# Patient Record
Sex: FEMALE | Race: WHITE | Employment: OTHER | URBAN - NONMETROPOLITAN AREA
[De-identification: names, ages, dates, MRNs, and addresses within clinical notes are randomized per-mention and may not be internally consistent; named-entity substitution may affect disease eponyms.]

---

## 2017-02-13 RX ORDER — HYDROCHLOROTHIAZIDE 25 MG/1
25 TABLET ORAL DAILY
Qty: 90 TABLET | Refills: 0 | Status: SHIPPED | OUTPATIENT
Start: 2017-02-13 | End: 2017-05-12

## 2017-02-13 RX ORDER — ATENOLOL 50 MG/1
TABLET ORAL
Qty: 180 TABLET | Refills: 0 | Status: SHIPPED | OUTPATIENT
Start: 2017-02-13 | End: 2017-05-12

## 2017-03-17 ENCOUNTER — PATIENT OUTREACH (OUTPATIENT)
Dept: FAMILY MEDICINE CLINIC | Facility: CLINIC | Age: 82
End: 2017-03-17

## 2017-05-12 RX ORDER — ATENOLOL 50 MG/1
TABLET ORAL
Qty: 180 TABLET | Refills: 0 | Status: SHIPPED | OUTPATIENT
Start: 2017-05-12 | End: 2017-12-13

## 2017-05-12 RX ORDER — HYDROCHLOROTHIAZIDE 25 MG/1
25 TABLET ORAL DAILY
Qty: 90 TABLET | Refills: 0 | Status: SHIPPED | OUTPATIENT
Start: 2017-05-12 | End: 2017-08-14

## 2017-05-12 NOTE — TELEPHONE ENCOUNTER
Last OV: 11/30/2016  120/80  Last labs: 6/8/2016  Atenolol: 2/13/2017 #180 no RF  HCTZ: 2/13/2017 #90 no RF    Future Appointments  Date Time Provider Teofilo Brian   12/13/2017 2:20 PM Angelica Bach MD [facility]

## 2017-06-28 ENCOUNTER — OFFICE VISIT (OUTPATIENT)
Dept: FAMILY MEDICINE CLINIC | Facility: CLINIC | Age: 82
End: 2017-06-28

## 2017-06-28 VITALS
TEMPERATURE: 99 F | SYSTOLIC BLOOD PRESSURE: 136 MMHG | DIASTOLIC BLOOD PRESSURE: 74 MMHG | WEIGHT: 156.25 LBS | BODY MASS INDEX: 32 KG/M2

## 2017-06-28 DIAGNOSIS — E55.9 VITAMIN D DEFICIENCY: ICD-10-CM

## 2017-06-28 DIAGNOSIS — Z79.899 ENCOUNTER FOR LONG-TERM (CURRENT) USE OF MEDICATIONS: Primary | ICD-10-CM

## 2017-06-28 DIAGNOSIS — I10 ESSENTIAL HYPERTENSION, BENIGN: ICD-10-CM

## 2017-06-28 DIAGNOSIS — E78.2 MIXED HYPERLIPIDEMIA: ICD-10-CM

## 2017-06-28 PROCEDURE — 80053 COMPREHEN METABOLIC PANEL: CPT | Performed by: FAMILY MEDICINE

## 2017-06-28 PROCEDURE — 99213 OFFICE O/P EST LOW 20 MIN: CPT | Performed by: FAMILY MEDICINE

## 2017-06-28 PROCEDURE — 80061 LIPID PANEL: CPT | Performed by: FAMILY MEDICINE

## 2017-06-28 PROCEDURE — 82306 VITAMIN D 25 HYDROXY: CPT | Performed by: FAMILY MEDICINE

## 2017-06-28 NOTE — ASSESSMENT & PLAN NOTE
As for her cholesterol, Lipids are well controlled,   PLAN: will continue present medications, reviewed diet, exercise and weight control, and labs as ordered

## 2017-06-28 NOTE — PROGRESS NOTES
Trent Shepherd is a 80year old female. Patient presents with:  Hypertension: f/u on B/P.... Jen Navarro RM 6      HPI:   Patient presents for recheck of her  hypertension.  Pt has been taking medications as instructed, no medication side effects, home BP monitori 136/74  11/30/16 : 120/80  05/18/16 : 138/82  02/29/16 : 140/80  10/14/15 : 148/78  08/12/15 : 124/82      Wt Readings from Last 6 Encounters:  06/28/17 : 156 lb 4 oz  11/30/16 : 152 lb 2 oz  05/18/16 : 153 lb  02/29/16 : 143 lb 4 oz  10/14/15 : 156 lb  08 hypertension, benign     As for her hypertension, Blood Pressure is well controlled, no significant medication side effects noted.    PLAN: will continue present medications, reviewed diet, exercise and weight control, and labs as ordered             Other

## 2017-06-30 DIAGNOSIS — E55.9 VITAMIN D DEFICIENCY: ICD-10-CM

## 2017-06-30 DIAGNOSIS — Z00.00 ROUTINE HEALTH MAINTENANCE: ICD-10-CM

## 2017-06-30 DIAGNOSIS — E78.2 MIXED HYPERLIPIDEMIA: ICD-10-CM

## 2017-06-30 DIAGNOSIS — I10 ESSENTIAL HYPERTENSION, BENIGN: Primary | ICD-10-CM

## 2017-07-01 ENCOUNTER — TELEPHONE (OUTPATIENT)
Dept: FAMILY MEDICINE CLINIC | Facility: CLINIC | Age: 82
End: 2017-07-01

## 2017-08-14 ENCOUNTER — TELEPHONE (OUTPATIENT)
Dept: FAMILY MEDICINE CLINIC | Facility: CLINIC | Age: 82
End: 2017-08-14

## 2017-08-14 RX ORDER — METOPROLOL TARTRATE 100 MG/1
100 TABLET ORAL 2 TIMES DAILY
Qty: 180 TABLET | Refills: 0 | Status: SHIPPED | OUTPATIENT
Start: 2017-08-14 | End: 2017-11-13

## 2017-08-14 RX ORDER — HYDROCHLOROTHIAZIDE 25 MG/1
25 TABLET ORAL DAILY
Qty: 90 TABLET | Refills: 0 | Status: SHIPPED | OUTPATIENT
Start: 2017-08-14 | End: 2017-11-13

## 2017-08-14 NOTE — TELEPHONE ENCOUNTER
refill as metoprolol tartrate 100 mg po bid  #180  0 refills    I sent it already  You can close unless you need to call

## 2017-08-14 NOTE — TELEPHONE ENCOUNTER
Attempted to reach to notify of change of medication to Metoprolol and that she is not due for lab work. Will try again later.

## 2017-08-14 NOTE — TELEPHONE ENCOUNTER
HAD LABS DONE IN June, BUT RECEIVED LETTER STATING SHE IS DUE FOR LABS. ALSO W[facility].    PLEASE ADVISE

## 2017-08-14 NOTE — TELEPHONE ENCOUNTER
Last office visit: 6/28/17  Last refill: HCTZ 5/12/17 # 90 with 0 refills  Needs alternative for Atenolol 50 mg BID please.

## 2017-11-13 ENCOUNTER — TELEPHONE (OUTPATIENT)
Dept: FAMILY MEDICINE CLINIC | Facility: CLINIC | Age: 82
End: 2017-11-13

## 2017-11-13 RX ORDER — HYDROCHLOROTHIAZIDE 25 MG/1
25 TABLET ORAL DAILY
Qty: 90 TABLET | Refills: 0 | Status: SHIPPED | OUTPATIENT
Start: 2017-11-13 | End: 2018-12-17

## 2017-11-13 RX ORDER — METOPROLOL TARTRATE 100 MG/1
100 TABLET ORAL 2 TIMES DAILY
Qty: 180 TABLET | Refills: 0 | Status: SHIPPED | OUTPATIENT
Start: 2017-11-13 | End: 2017-12-13 | Stop reason: ALTCHOICE

## 2017-11-13 NOTE — TELEPHONE ENCOUNTER
Last OV: 6/28/2017   136/74  Last labs: 6/28/2017  Metoprolol: 8/14/2017 #180 no RF  HCTZ: 8/14/2017 #90 no RF

## 2017-12-13 ENCOUNTER — OFFICE VISIT (OUTPATIENT)
Dept: FAMILY MEDICINE CLINIC | Facility: CLINIC | Age: 82
End: 2017-12-13

## 2017-12-13 VITALS
HEART RATE: 76 BPM | BODY MASS INDEX: 30.65 KG/M2 | HEIGHT: 58.5 IN | TEMPERATURE: 98 F | WEIGHT: 150 LBS | OXYGEN SATURATION: 99 %

## 2017-12-13 DIAGNOSIS — Z23 NEED FOR DIPHTHERIA-TETANUS-PERTUSSIS (TDAP) VACCINE: ICD-10-CM

## 2017-12-13 DIAGNOSIS — Z00.00 ENCOUNTER FOR MEDICARE ANNUAL WELLNESS EXAM: ICD-10-CM

## 2017-12-13 DIAGNOSIS — E78.2 MIXED HYPERLIPIDEMIA: ICD-10-CM

## 2017-12-13 DIAGNOSIS — Z79.899 ENCOUNTER FOR LONG-TERM (CURRENT) USE OF MEDICATIONS: Primary | ICD-10-CM

## 2017-12-13 DIAGNOSIS — I10 ESSENTIAL HYPERTENSION, BENIGN: ICD-10-CM

## 2017-12-13 PROCEDURE — 90471 IMMUNIZATION ADMIN: CPT | Performed by: FAMILY MEDICINE

## 2017-12-13 PROCEDURE — 90715 TDAP VACCINE 7 YRS/> IM: CPT | Performed by: FAMILY MEDICINE

## 2017-12-13 PROCEDURE — G0439 PPPS, SUBSEQ VISIT: HCPCS | Performed by: FAMILY MEDICINE

## 2017-12-13 RX ORDER — ATENOLOL 50 MG/1
TABLET ORAL
Qty: 180 TABLET | Refills: 3 | Status: SHIPPED | OUTPATIENT
Start: 2017-12-13 | End: 2018-12-17

## 2017-12-13 NOTE — PATIENT INSTRUCTIONS
Zak Fisher's SCREENING SCHEDULE   Tests on this list are recommended by your physician but may not be covered, or covered at this frequency, by your insurer. Please check with your insurance carrier before scheduling to verify coverage.    Demi Fang Men who are 73-68 years old and have smoked more than 100 cigarettes in their lifetime   • Anyone with a family history    Colorectal Cancer Screening  Covered up to Age 76     Colonoscopy Screen   Covered every 10 years- more often if abnormal There are n Influenza  Covered Annually   Orders placed or performed in visit on 10/14/15  -INFLUENZA VIRUS VACCINE, PRESERV FREE, >=1YEARS OF AGE    Please get every year    Pneumococcal 13 (Prevnar)  Covered Once after 65   Orders placed or performed in visit on 10 Advance Directives.

## 2017-12-13 NOTE — PROGRESS NOTES
HPI:   María Tucker is a 80year old female who presents for a Medicare Subsequent Annual Wellness visit (Pt already had Initial Annual Wellness).       Her last annual assessment has been over 1 year: Annual Physical due on 11/30/2017         Fall/R [ezetimibe]. CURRENT MEDICATIONS:     Outpatient Prescriptions Marked as Taking for the 12/13/17 encounter (Office Visit) with Pb Licea MD:  hydrochlorothiazide 25 MG Oral Tab Take 1 tablet (25 mg total) by mouth daily.    Plant Sterols and Stanols • TDAP 12/13/2017   • Zoster (Shingles) 12/04/2013   Deferred Date(s) Deferred   • >=3 YRS TRI  MULTIDOSE VIAL (39610) FLU CLINIC 11/30/2016   • Afluria, 9 Years & >, IM 10/14/2015        ASSESSMENT AND OTHER RELEVANT CHRONIC CONDITIONS:   [place] Interaction; Visiting Family      This section provided for quick review of chart, separate sheet to patient  [address] [place] [address],[address] Internal Lab or Procedure External Lab or Procedure   Diabetes Screening      HbgA1C   Annually No Influenza  Covered Annually  Please get every year    Pneumococcal 13 (Prevnar)  Covered Once after 65 10/14/2015 Please get once after your 65th birthday    Pneumococcal 23 (Pneumovax)  Covered Once after 65 03/26/2010 Please get once after your 65th vinay

## 2018-05-10 ENCOUNTER — PATIENT OUTREACH (OUTPATIENT)
Dept: FAMILY MEDICINE CLINIC | Facility: CLINIC | Age: 83
End: 2018-05-10

## 2018-12-17 ENCOUNTER — OFFICE VISIT (OUTPATIENT)
Dept: FAMILY MEDICINE CLINIC | Facility: CLINIC | Age: 83
End: 2018-12-17
Payer: MEDICARE

## 2018-12-17 VITALS
HEART RATE: 68 BPM | TEMPERATURE: 99 F | WEIGHT: 146.38 LBS | BODY MASS INDEX: 29.12 KG/M2 | SYSTOLIC BLOOD PRESSURE: 136 MMHG | OXYGEN SATURATION: 98 % | HEIGHT: 59.5 IN | DIASTOLIC BLOOD PRESSURE: 80 MMHG

## 2018-12-17 DIAGNOSIS — I10 ESSENTIAL HYPERTENSION, BENIGN: ICD-10-CM

## 2018-12-17 DIAGNOSIS — Z28.21 IMMUNIZATION NOT CARRIED OUT BECAUSE OF PATIENT REFUSAL: ICD-10-CM

## 2018-12-17 DIAGNOSIS — Z00.00 ENCOUNTER FOR ANNUAL HEALTH EXAMINATION: ICD-10-CM

## 2018-12-17 PROCEDURE — G0439 PPPS, SUBSEQ VISIT: HCPCS | Performed by: FAMILY MEDICINE

## 2018-12-17 RX ORDER — ATENOLOL 50 MG/1
TABLET ORAL
Qty: 180 TABLET | Refills: 3 | Status: SHIPPED | OUTPATIENT
Start: 2018-12-17 | End: 2019-12-20

## 2018-12-17 RX ORDER — HYDROCHLOROTHIAZIDE 25 MG/1
25 TABLET ORAL DAILY
Qty: 90 TABLET | Refills: 3 | Status: SHIPPED | OUTPATIENT
Start: 2018-12-17 | End: 2019-12-16

## 2018-12-17 NOTE — PROGRESS NOTES
HPI:   Paul Walter is a 80year old female who presents for a Medicare Subsequent Annual Wellness visit (Pt already had Initial Annual Wellness).       Her last annual assessment has been over 1 year: Annual Physical due on 12/13/2018         Fall/R 06/28/2017    CREATSERUM 0.94 06/28/2017    GLU 97 06/28/2017        CBC  (most recent labs)   No results found for: WBC, HGB, PLT     ALLERGIES:   She is allergic to allergy; dust; grass; statins; and zetia [ezetimibe].     CURRENT MEDICATIONS:     [address] Deferred Date(s) Deferred   • >=3 YRS TRI  MULTIDOSE VIAL (63512) FLU CLINIC 11/30/2016, 12/17/2018   • Afluria, 9 Years & >, IM 10/14/2015        ASSESSMENT AND OTHER RELEVANT CHRONIC CONDITIONS:   María Tucker is a 80year old female who presents would you describe your daily physical activity?: Moderate  How would you describe your current health state?: Good  How do you maintain positive mental well-being?: Visiting Friends; Visiting Family; Social Interaction      This section provided for quick r preventive care reminders to display for this patient.  Update Health Maintenance if applicable     Immunizations (Update Immunization Activity if applicable)     Influenza  Covered Annually  Please get every year    Pneumococcal 13 (Prevnar)  Covered Once

## 2018-12-17 NOTE — PATIENT INSTRUCTIONS
Caridad Fisher's SCREENING SCHEDULE   Tests on this list are recommended by your physician but may not be covered, or covered at this frequency, by your insurer. Please check with your insurance carrier before scheduling to verify coverage.    Miladys Mcmullen years old and have smoked more than 100 cigarettes in their lifetime   • Anyone with a family history    Colorectal Cancer Screening  Covered up to Age 76     Colonoscopy Screen   Covered every 10 years- more often if abnormal There are no preventive care Annually Orders placed or performed in visit on 10/14/15   • INFLUENZA VIRUS VACCINE, PRESERV FREE, >=1YEARS OF AGE    Please get every year    Pneumococcal 13 (Prevnar)  Covered Once after 65 Orders placed or performed in visit on 10/14/15   • [facility] also has information from the [address] [place] [address] regarding Advance Directives.

## 2019-09-23 ENCOUNTER — MOBILE ENCOUNTER (OUTPATIENT)
Dept: FAMILY MEDICINE CLINIC | Facility: CLINIC | Age: 84
End: 2019-09-23

## 2019-12-16 DIAGNOSIS — I10 ESSENTIAL HYPERTENSION, BENIGN: ICD-10-CM

## 2019-12-17 RX ORDER — HYDROCHLOROTHIAZIDE 25 MG/1
TABLET ORAL
Qty: 90 TABLET | Refills: 0 | Status: SHIPPED | OUTPATIENT
Start: 2019-12-17 | End: 2019-12-30

## 2019-12-17 NOTE — TELEPHONE ENCOUNTER
Last refill x 1 year on 12/17/18  Last office visit on 12/17/2018  Future Appointments   Date Time Provider Teofilo Weni   12/30/2019  4:00 PM Ada oRbles MD [facility] [place]

## 2019-12-20 DIAGNOSIS — I10 ESSENTIAL HYPERTENSION, BENIGN: ICD-10-CM

## 2019-12-20 RX ORDER — ATENOLOL 50 MG/1
TABLET ORAL
Qty: 180 TABLET | Refills: 3 | Status: SHIPPED | OUTPATIENT
Start: 2019-12-20 | End: 2019-12-30

## 2019-12-20 NOTE — TELEPHONE ENCOUNTER
Last office visit: 12/17/18 /80  Last refill: 12/17/18  Labs Due: 6/30/18  Future Appointments   Date Time Provider Teofilo Brian   12/30/2019  4:00 PM Maryjane Fang MD [facility] [place]

## 2019-12-30 ENCOUNTER — OFFICE VISIT (OUTPATIENT)
Dept: FAMILY MEDICINE CLINIC | Facility: CLINIC | Age: 84
End: 2019-12-30
Payer: MEDICARE

## 2019-12-30 VITALS
RESPIRATION RATE: 18 BRPM | BODY MASS INDEX: 30.16 KG/M2 | WEIGHT: 147.63 LBS | HEART RATE: 110 BPM | SYSTOLIC BLOOD PRESSURE: 138 MMHG | TEMPERATURE: 98 F | DIASTOLIC BLOOD PRESSURE: 98 MMHG | HEIGHT: 58.5 IN

## 2019-12-30 DIAGNOSIS — I10 ESSENTIAL HYPERTENSION, BENIGN: Primary | ICD-10-CM

## 2019-12-30 DIAGNOSIS — Z00.00 ENCOUNTER FOR ANNUAL HEALTH EXAMINATION: ICD-10-CM

## 2019-12-30 DIAGNOSIS — Z28.21 IMMUNIZATION NOT CARRIED OUT BECAUSE OF PATIENT REFUSAL: ICD-10-CM

## 2019-12-30 PROCEDURE — G0439 PPPS, SUBSEQ VISIT: HCPCS | Performed by: FAMILY MEDICINE

## 2019-12-30 RX ORDER — HYDRALAZINE HYDROCHLORIDE 10 MG/1
10 TABLET, FILM COATED ORAL 3 TIMES DAILY PRN
Qty: 90 TABLET | Refills: 1 | Status: SHIPPED | OUTPATIENT
Start: 2019-12-30 | End: 2020-12-31 | Stop reason: ALTCHOICE

## 2019-12-30 RX ORDER — HYDRALAZINE HYDROCHLORIDE 10 MG/1
10 TABLET, FILM COATED ORAL 3 TIMES DAILY PRN
Qty: 90 TABLET | Refills: 1 | Status: SHIPPED | OUTPATIENT
Start: 2019-12-30 | End: 2019-12-30

## 2019-12-30 NOTE — PROGRESS NOTES
HPI:   Jina Henry is a 80year old female who presents for a Medicare Subsequent Annual Wellness visit (Pt already had Initial Annual Wellness).     Feels nervous  States her blood pressure is up only when nervous    She has weaned off of all of he (66.4 kg)  12/13/17 : 150 lb (68 kg)     Last Cholesterol Labs:   Lab Results   Component Value Date    CHOLEST 198 06/28/2017    HDL 70 06/28/2017     06/28/2017    TRIG 80 06/28/2017          Last Chemistry Labs:   Lab Results   Component Value Da (Temporal)   Resp 18   Ht 58.5\"   Wt 147 lb 9.6 oz (67 kg)   BMI 30.32 kg/m²  Estimated body mass index is 30.32 kg/m² as calculated from the following:    Height as of this encounter: 58.5\". Weight as of this encounter: 147 lb 9.6 oz (67 kg).     Medi patient refusal        Encounter for annual health examination            Reinforced healthy diet, lifestyle, and exercise. Prescription medication ordered. Continue with current treatment plan.     Return in 1 year (on 12/30/2020) for Wellness Visit, darío flowsheet data found. Screening Mammogram      Mammogram Annually to 76, then as discussed There are no preventive care reminders to display for this patient.  Update Health Maintenance if applicable     Immunizations (Update Immunization Activity if jolie

## 2019-12-30 NOTE — PATIENT INSTRUCTIONS
Margoth Fisher's SCREENING SCHEDULE   Tests on this list are recommended by your physician but may not be covered, or covered at this frequency, by your insurer. Please check with your insurance carrier before scheduling to verify coverage.    Rona Dick years old and have smoked more than 100 cigarettes in their lifetime   • Anyone with a family history    Colorectal Cancer Screening  Covered up to Age 76     Colonoscopy Screen   Covered every 10 years- more often if abnormal There are no preventive care Annually Orders placed or performed in visit on 10/14/15   • INFLUENZA VIRUS VACCINE, PRESERV FREE, >=1YEARS OF AGE    Please get every year    Pneumococcal 13 (Prevnar)  Covered Once after 65 Orders placed or performed in visit on 10/14/15   • [facility] also has information from the [address] [place] [address] regarding Advance Directives.

## 2020-12-31 ENCOUNTER — OFFICE VISIT (OUTPATIENT)
Dept: FAMILY MEDICINE CLINIC | Facility: CLINIC | Age: 85
End: 2020-12-31
Payer: MEDICARE

## 2020-12-31 VITALS
HEIGHT: 59.5 IN | SYSTOLIC BLOOD PRESSURE: 148 MMHG | HEART RATE: 102 BPM | TEMPERATURE: 99 F | WEIGHT: 132.13 LBS | BODY MASS INDEX: 26.28 KG/M2 | OXYGEN SATURATION: 97 % | DIASTOLIC BLOOD PRESSURE: 88 MMHG | RESPIRATION RATE: 16 BRPM

## 2020-12-31 DIAGNOSIS — Z28.21 IMMUNIZATION NOT CARRIED OUT BECAUSE OF PATIENT REFUSAL: ICD-10-CM

## 2020-12-31 DIAGNOSIS — H61.22 CERUMINOSIS, LEFT: ICD-10-CM

## 2020-12-31 DIAGNOSIS — I10 ESSENTIAL HYPERTENSION, BENIGN: ICD-10-CM

## 2020-12-31 DIAGNOSIS — Z00.00 ENCOUNTER FOR ANNUAL HEALTH EXAMINATION: Primary | ICD-10-CM

## 2020-12-31 DIAGNOSIS — E78.2 MIXED HYPERLIPIDEMIA: ICD-10-CM

## 2020-12-31 PROCEDURE — G0439 PPPS, SUBSEQ VISIT: HCPCS | Performed by: FAMILY MEDICINE

## 2020-12-31 NOTE — PATIENT INSTRUCTIONS
Enriqueta Fisher's SCREENING SCHEDULE   Tests on this list are recommended by your physician but may not be covered, or covered at this frequency, by your insurer. Please check with your insurance carrier before scheduling to verify coverage.    Shree Michelle years old and have smoked more than 100 cigarettes in their lifetime   • Anyone with a family history    Colorectal Cancer Screening  Covered up to Age 76     Colonoscopy Screen   Covered every 10 years- more often if abnormal There are no preventive care Annually Orders placed or performed in visit on 10/14/15   • INFLUENZA VIRUS VACCINE, PRESERV FREE, >=1YEARS OF AGE    Please get every year    Pneumococcal 13 (Prevnar)  Covered Once after 65 Orders placed or performed in visit on 10/14/15   • [facility] also has information from the [address] [place] [address] regarding Advance Directives.

## 2020-12-31 NOTE — PROGRESS NOTES
HPI:   Waleska Laboy is a 80year old female who presents for a Medicare Subsequent Annual Wellness visit (Pt already had Initial Annual Wellness). Patient feels generally well.   She has moved to a new [facility] in [facility])  Efra Degroot MD as PCP - [facility]    Patient Active Problem List:     Mixed hyperlipidemia     Essential hypertension, benign    Wt Readings from Last 3 Encounters:  12/31/20 : 132 lb 2 oz (59.9 kg)  12/30/19 : 147 lb 9.6 oz (67 kg)  12/17/18 : 14 index is 26.24 kg/m² as calculated from the following:    Height as of this encounter: 4' 11.5\" (1.511 m). Weight as of this encounter: 132 lb 2 oz (59.9 kg).     Medicare Hearing Assessment  (Required for AWV/SWV)    Hearing Screening    Time taken: 15 Visit Diagnoses     Encounter for annual health examination    -  Primary    Immunization not carried out because of patient refusal        Relevant Orders    INFLUENZA REFUSED DMG    Ceruminosis, left        Irrigated and partially cleared can use Debrox Blood Annually No results found for: FOB No flowsheet data found. Glaucoma Screening      Ophthalmology Visit Annually: Diabetics, FHx Glaucoma, AA>50, > 65 No flowsheet data found.     Bone Density Screening      Dexascan Every two years No resu

## 2021-03-12 DIAGNOSIS — Z23 NEED FOR VACCINATION: ICD-10-CM

## 2021-12-29 ENCOUNTER — OFFICE VISIT (OUTPATIENT)
Dept: FAMILY MEDICINE CLINIC | Facility: CLINIC | Age: 86
End: 2021-12-29
Payer: MEDICARE

## 2021-12-29 VITALS
WEIGHT: 124 LBS | SYSTOLIC BLOOD PRESSURE: 138 MMHG | TEMPERATURE: 99 F | DIASTOLIC BLOOD PRESSURE: 80 MMHG | HEART RATE: 88 BPM | HEIGHT: 58 IN | BODY MASS INDEX: 26.03 KG/M2 | RESPIRATION RATE: 16 BRPM

## 2021-12-29 DIAGNOSIS — E78.2 MIXED HYPERLIPIDEMIA: ICD-10-CM

## 2021-12-29 DIAGNOSIS — I10 ESSENTIAL HYPERTENSION, BENIGN: ICD-10-CM

## 2021-12-29 DIAGNOSIS — Z28.21 IMMUNIZATION NOT CARRIED OUT BECAUSE OF PATIENT REFUSAL: ICD-10-CM

## 2021-12-29 DIAGNOSIS — Z00.00 ENCOUNTER FOR MEDICARE ANNUAL WELLNESS EXAM: Primary | ICD-10-CM

## 2021-12-29 PROCEDURE — G0439 PPPS, SUBSEQ VISIT: HCPCS | Performed by: FAMILY MEDICINE

## 2021-12-29 NOTE — PROGRESS NOTES
HPI:   Mahad Green is a 80year old female who presents for a Medicare Subsequent Annual Wellness visit (Pt already had Initial Annual Wellness).     Feels well  No falls    Harder to walk especially at stores and can only go  ft without stopp She has a Power of  for [employer] on file in [place]. She has never smoked tobacco.    CAGE screening score of 0 on 12/29/2021, showing low risk of alcohol abuse.         Patient Care Team: Patient Care Team:  Cristina Chiang MD as PCP - Иван Ibrahim She  reports that she has never smoked. She has never used smokeless tobacco. She reports previous alcohol use. She reports that she does not use drugs.      REVIEW OF SYSTEMS:        EXAM:   /80 (BP Location: Right arm, Patient Position: Sitting, C Mixed hyperlipidemia  Overview: On otc meds  4.  Immunization not carried out because of patient refusal      Return in about 1 year (around 12/29/2022), or if symptoms worsen or fail to improve, for wellness exam.     Edna Dasilva MD, 12/29/2021 years old and have ever smoked   • Anyone with a family history -     Colorectal Cancer Screening  Covered for ages 52-80; only need ONE of the following:    Colonoscopy   Covered every 10 years    Covered every 2 years if patient is at high risk or previo

## 2021-12-29 NOTE — PATIENT INSTRUCTIONS
Sherri Fisher's SCREENING SCHEDULE   Tests on this list are recommended by your physician but may not be covered, or covered at this frequency, by your insurer. Please check with your insurance carrier before scheduling to verify coverage.    PREVEN time   Pap and Pelvic    Pap   Covered every 2 years for women at normal risk;  Annually if at high risk -  No recommendations at this time    Chlamydia Annually if high risk -  No recommendations at this time   Screening Mammogram    Mammogram     Recommen

## 2022-12-27 ENCOUNTER — OFFICE VISIT (OUTPATIENT)
Dept: FAMILY MEDICINE CLINIC | Facility: CLINIC | Age: 87
End: 2022-12-27
Payer: MEDICARE

## 2022-12-27 VITALS
BODY MASS INDEX: 25.29 KG/M2 | HEART RATE: 100 BPM | SYSTOLIC BLOOD PRESSURE: 118 MMHG | HEIGHT: 58.25 IN | RESPIRATION RATE: 12 BRPM | WEIGHT: 122.13 LBS | DIASTOLIC BLOOD PRESSURE: 80 MMHG | TEMPERATURE: 98 F | OXYGEN SATURATION: 100 %

## 2022-12-27 DIAGNOSIS — E78.2 MIXED HYPERLIPIDEMIA: ICD-10-CM

## 2022-12-27 DIAGNOSIS — I10 ESSENTIAL HYPERTENSION, BENIGN: ICD-10-CM

## 2022-12-27 DIAGNOSIS — H61.23 HEARING LOSS DUE TO CERUMEN IMPACTION, BILATERAL: ICD-10-CM

## 2022-12-27 DIAGNOSIS — Z00.00 ENCOUNTER FOR ANNUAL HEALTH EXAMINATION: Primary | ICD-10-CM

## 2022-12-27 PROCEDURE — 1126F AMNT PAIN NOTED NONE PRSNT: CPT | Performed by: FAMILY MEDICINE

## 2022-12-27 PROCEDURE — G0439 PPPS, SUBSEQ VISIT: HCPCS | Performed by: FAMILY MEDICINE

## 2023-10-23 ENCOUNTER — MED REC SCAN ONLY (OUTPATIENT)
Dept: FAMILY MEDICINE CLINIC | Facility: CLINIC | Age: 88
End: 2023-10-23